# Patient Record
Sex: FEMALE | Race: WHITE
[De-identification: names, ages, dates, MRNs, and addresses within clinical notes are randomized per-mention and may not be internally consistent; named-entity substitution may affect disease eponyms.]

---

## 2017-01-24 ENCOUNTER — HOSPITAL ENCOUNTER (EMERGENCY)
Dept: HOSPITAL 17 - PHEFT | Age: 42
Discharge: HOME | End: 2017-01-24
Payer: SELF-PAY

## 2017-01-24 VITALS
DIASTOLIC BLOOD PRESSURE: 103 MMHG | RESPIRATION RATE: 16 BRPM | TEMPERATURE: 98.6 F | HEART RATE: 105 BPM | SYSTOLIC BLOOD PRESSURE: 135 MMHG | OXYGEN SATURATION: 99 %

## 2017-01-24 VITALS — SYSTOLIC BLOOD PRESSURE: 139 MMHG | DIASTOLIC BLOOD PRESSURE: 93 MMHG

## 2017-01-24 VITALS — HEIGHT: 64 IN | WEIGHT: 147.71 LBS | BODY MASS INDEX: 25.22 KG/M2

## 2017-01-24 DIAGNOSIS — S39.012A: Primary | ICD-10-CM
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
## 2017-01-24 PROCEDURE — 96372 THER/PROPH/DIAG INJ SC/IM: CPT

## 2017-01-24 PROCEDURE — 99283 EMERGENCY DEPT VISIT LOW MDM: CPT

## 2017-01-24 NOTE — PD
HPI


Chief Complaint:  Musculoskeletal Complaint


Time Seen by Provider:  17:40


Travel History


International Travel<30 days:  No


Contact w/Intl Traveler<30days:  No


Traveled to known affect area:  No





History of Present Illness


HPI


41-year-old female presents to the emergency room for evaluation of right-sided 

low back pain for the past 2 days.  Patient states she twisted funny and 

exacerbated her pain.  She has chronic low back pain due to arthritis and 

scoliosis.  States pain is constant, dull ache that occasionally becomes a sharp

, stabbing pain.  It occasionally radiates to the left lower back.  No 

radiation down the lower extremity.  States the pain is so severe at times that 

causes her to have a tension headache.  Headache is typical, gradual onset, 

wrapping around her head.  She has been taking ibuprofen for pain without 

significant relief in symptoms.  No saddle anesthesia, loss of bowel or bladder 

control, or lower extremity paresthesias  Denies IV drug use, fevers, and 

weight loss.





PFSH


Past Medical History


Depression:  Yes


Diminished Hearing:  No


Immunizations Current:  No


Pancreatitis:  Yes (HOSPITALIZED LAST MONTH IN ICU AT [facility])


Tetanus Vaccination:  > 5 Years


Influenza Vaccination:  No


Pregnant?:  Not Pregnant


LMP:  TUBAL


Tubal Ligation:  Yes





Past Surgical History


 Section:  Yes





Social History


Alcohol Use:  Yes ("ALCOHOLIC")


Tobacco Use:  Yes (1 PPD)


Substance Use:  Yes (ETOH )





Allergies-Medications


(Allergen,Severity, Reaction):  


Coded Allergies:  


     No Known Allergies (Unverified , 17)


Reported Meds & Prescriptions





Reported Meds & Active Scripts


Active


Ibuprofen 800 Mg Tab 800 Mg PO Q8H PRN


Robaxin (Methocarbamol) 750 Mg Tab 750 Mg PO Q8HR








Review of Systems


Except as stated in HPI:  all other systems reviewed are Neg





Physical Exam


Narrative


GENERAL: Well-nourished, well-developed female in no acute distress.  Afebrile.

  Ambulatory without antalgic gait.


SKIN: Warm and dry.


HEAD: Normocephalic.


EYES: No scleral icterus. No injection or drainage. 


NECK: Supple, trachea midline. No JVD or lymphadenopathy.


CARDIOVASCULAR: Regular rate and rhythm without murmurs, gallops, or rubs. 


RESPIRATORY: Breath sounds equal bilaterally. No accessory muscle use.


BACK: No midline tenderness.  No obvious deformity. No CVA tenderness.  Mild 

tenderness to palpation of the right lower paraspinous musculature.  2+ 

patellar reflexes equal bilaterally.





Data


Data


Last Documented VS





Vital Signs








  Date Time  Temp Pulse Resp B/P Pulse Ox O2 Delivery O2 Flow Rate FiO2


 


17 17:25 98.6 105 16 135/103 99   








Orders





 Orphenadrine Inj (Norflex Inj) (17 17:45)


Ketorolac Inj (Toradol Inj) (17 17:45)








MDM


Medical Decision Making


Medical Screen Exam Complete:  Yes


Emergency Medical Condition:  Yes


Medical Record Reviewed:  Yes


Differential Diagnosis


Muscle spasm versus strain versus fracture unlikely


Narrative Course


41-year-old female with history of chronic back pain due to scoliosis and 

arthritis presents to the emergency room for evaluation of low back pain for 

the past 2 days.  Patient states she twisted the wrong direction and has had 

pain since then.  Denies any other trauma.  Pain is causing a tension headache.

  Ibuprofen has not been improving symptoms.  She is well-appearing.  

Ambulatory without antalgic gait.  No focal neurological deficits.  No midline 

tenderness.  No red flag symptoms.  No indication for emergent imaging at this 

time.  Eforcse shows 2 prescriptions for Percocet from 2 different providers 

over the past 22 days.  She will be given Toradol and Norflex in the emergency 

room and discharged with prescriptions for ibuprofen and Robaxin.  Told to 

follow up with the primary care physician or return for worsening symptoms.  

She understands and agrees to plan.





Diagnosis





 Primary Impression:  


 Low back strain


 Qualified Code:  S39.012A - Low back strain, initial encounter


Referrals:  


Primary Care Physician


Patient Instructions:  General Instructions, Low Back Strain (ED)





***Additional Instructions:


Rest and drink plenty of fluids.


Take Robaxin as directed, as needed for pain.


Take ibuprofen with food as directed, as needed for pain.


Apply ice to the affected area for 20 minutes at a time, as needed for pain and 

swelling.


Follow-up with a primary care physician.


Return to the emergency room for worsening symptoms.


***Med/Other Pt SpecificInfo:  Prescription(s) given


Scripts


Ibuprofen 800 Mg Get508 Mg PO Q8H PRN (Pain/Inflammation) #21 TAB  Ref 0


   Prov:Des Doshi MD         17 


Methocarbamol (Robaxin)750 Mg Xns622 Mg PO Q8HR  #21 TAB  Ref 0


   Prov:Des Doshi MD         17


Disposition:  01 DISCHARGE HOME


Condition:  Stable








Alexandra Orellana 2017 17:48

## 2018-03-22 ENCOUNTER — HOSPITAL ENCOUNTER (EMERGENCY)
Dept: HOSPITAL 17 - NEPJ | Age: 43
LOS: 1 days | Discharge: HOME | End: 2018-03-23
Payer: SELF-PAY

## 2018-03-22 VITALS
HEART RATE: 112 BPM | TEMPERATURE: 98.5 F | SYSTOLIC BLOOD PRESSURE: 129 MMHG | DIASTOLIC BLOOD PRESSURE: 64 MMHG | OXYGEN SATURATION: 98 % | RESPIRATION RATE: 16 BRPM

## 2018-03-22 VITALS
OXYGEN SATURATION: 98 % | HEART RATE: 88 BPM | RESPIRATION RATE: 16 BRPM | DIASTOLIC BLOOD PRESSURE: 57 MMHG | SYSTOLIC BLOOD PRESSURE: 104 MMHG | TEMPERATURE: 98.3 F

## 2018-03-22 VITALS
DIASTOLIC BLOOD PRESSURE: 75 MMHG | RESPIRATION RATE: 19 BRPM | HEART RATE: 100 BPM | OXYGEN SATURATION: 99 % | SYSTOLIC BLOOD PRESSURE: 130 MMHG

## 2018-03-22 VITALS — HEIGHT: 64 IN | WEIGHT: 147.71 LBS | BODY MASS INDEX: 25.22 KG/M2

## 2018-03-22 DIAGNOSIS — L03.113: Primary | ICD-10-CM

## 2018-03-22 DIAGNOSIS — Z79.899: ICD-10-CM

## 2018-03-22 DIAGNOSIS — F19.10: ICD-10-CM

## 2018-03-22 DIAGNOSIS — F32.9: ICD-10-CM
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
## 2018-03-22 DIAGNOSIS — F10.229: ICD-10-CM

## 2018-03-22 DIAGNOSIS — F11.10: ICD-10-CM

## 2018-03-22 LAB
ALBUMIN SERPL-MCNC: 3.5 GM/DL (ref 3.4–5)
ALP SERPL-CCNC: 66 U/L (ref 45–117)
ALT SERPL-CCNC: 70 U/L (ref 10–53)
AST SERPL-CCNC: 38 U/L (ref 15–37)
BASOPHILS # BLD AUTO: 0 TH/MM3 (ref 0–0.2)
BASOPHILS NFR BLD: 0.8 % (ref 0–2)
BILIRUB SERPL-MCNC: 0.2 MG/DL (ref 0.2–1)
BUN SERPL-MCNC: 18 MG/DL (ref 7–18)
CALCIUM SERPL-MCNC: 8.7 MG/DL (ref 8.5–10.1)
CHLORIDE SERPL-SCNC: 108 MEQ/L (ref 98–107)
COLOR UR: YELLOW
CREAT SERPL-MCNC: 0.55 MG/DL (ref 0.5–1)
EOSINOPHIL # BLD: 0.1 TH/MM3 (ref 0–0.4)
EOSINOPHIL NFR BLD: 1.7 % (ref 0–4)
ERYTHROCYTE [DISTWIDTH] IN BLOOD BY AUTOMATED COUNT: 13.5 % (ref 11.6–17.2)
GFR SERPLBLD BASED ON 1.73 SQ M-ARVRAT: 120 ML/MIN (ref 89–?)
GLUCOSE SERPL-MCNC: 99 MG/DL (ref 74–106)
GLUCOSE UR STRIP-MCNC: (no result) MG/DL
HCO3 BLD-SCNC: 24.3 MEQ/L (ref 21–32)
HCT VFR BLD CALC: 38 % (ref 35–46)
HGB BLD-MCNC: 13.2 GM/DL (ref 11.6–15.3)
HGB UR QL STRIP: (no result)
HYALINE CASTS #/AREA URNS LPF: 1 /LPF
KETONES UR STRIP-MCNC: (no result) MG/DL
LYMPHOCYTES # BLD AUTO: 2.5 TH/MM3 (ref 1–4.8)
LYMPHOCYTES NFR BLD AUTO: 43 % (ref 9–44)
MCH RBC QN AUTO: 31.5 PG (ref 27–34)
MCHC RBC AUTO-ENTMCNC: 34.8 % (ref 32–36)
MCV RBC AUTO: 90.6 FL (ref 80–100)
MONOCYTE #: 0.5 TH/MM3 (ref 0–0.9)
MONOCYTES NFR BLD: 8.4 % (ref 0–8)
MUCOUS THREADS #/AREA URNS LPF: (no result) /LPF
NEUTROPHILS # BLD AUTO: 2.7 TH/MM3 (ref 1.8–7.7)
NEUTROPHILS NFR BLD AUTO: 46.1 % (ref 16–70)
NITRITE UR QL STRIP: (no result)
PLATELET # BLD: 185 TH/MM3 (ref 150–450)
PMV BLD AUTO: 8.4 FL (ref 7–11)
PROT SERPL-MCNC: 6.9 GM/DL (ref 6.4–8.2)
RBC # BLD AUTO: 4.2 MIL/MM3 (ref 4–5.3)
SODIUM SERPL-SCNC: 142 MEQ/L (ref 136–145)
SP GR UR STRIP: 1.01 (ref 1–1.03)
SQUAMOUS #/AREA URNS HPF: 6 /HPF (ref 0–5)
URINE LEUKOCYTE ESTERASE: (no result)
WBC # BLD AUTO: 5.8 TH/MM3 (ref 4–11)

## 2018-03-22 PROCEDURE — 99284 EMERGENCY DEPT VISIT MOD MDM: CPT

## 2018-03-22 PROCEDURE — 80053 COMPREHEN METABOLIC PANEL: CPT

## 2018-03-22 PROCEDURE — 84443 ASSAY THYROID STIM HORMONE: CPT

## 2018-03-22 PROCEDURE — 81001 URINALYSIS AUTO W/SCOPE: CPT

## 2018-03-22 PROCEDURE — 80307 DRUG TEST PRSMV CHEM ANLYZR: CPT

## 2018-03-22 PROCEDURE — 83690 ASSAY OF LIPASE: CPT

## 2018-03-22 PROCEDURE — 85025 COMPLETE CBC W/AUTO DIFF WBC: CPT

## 2018-03-22 RX ADMIN — SULFAMETHOXAZOLE AND TRIMETHOPRIM SCH TAB: 800; 160 TABLET ORAL at 20:59

## 2018-03-22 RX ADMIN — SULFAMETHOXAZOLE AND TRIMETHOPRIM SCH TAB: 800; 160 TABLET ORAL at 14:36

## 2018-03-22 NOTE — PD
HPI


Chief Complaint:  Psychiatric Symptoms


Time Seen by Provider:  13:31


Travel History


International Travel<30 days:  No


Contact w/Intl Traveler<30days:  No


Traveled to known affect area:  No





History of Present Illness


HPI


42-year-old  female brought in under the baker act with history of 

polysubstance abuse.  Patient is suicidal and threatening.  She is 

uncooperative.  Patient is noted to have a healing abscess to the right 

posterior shoulder from a "spider bite".  Patient denies fever, chills, or 

other symptoms.  She states she uses Percocet and alcohol.  She states she has 

been on Bactrim for 2 days for her abscess.  She states it was drained about 3 

weeks ago.  He continues to have some mild purulent drainage.  She denies 

significant pain.  She has no known drug allergies.





PFSH


Past Medical History


Depression:  Yes


Diminished Hearing:  No


Medical other:  Yes (Pt states she has a history of alcoholism and pancreatitis)


Immunizations Current:  No


Pancreatitis:  Yes (HOSPITALIZED LAST MONTH IN ICU AT [facility])


Pregnant?:  Not Pregnant


Tubal Ligation:  Yes





Past Surgical History


 Section:  Yes





Social History


Alcohol Use:  Yes (4 years sober, started drinking again today 3/22/18)


Tobacco Use:  Yes (1 pack/day)


Substance Use:  Yes (ETOH )





Allergies-Medications


(Allergen,Severity, Reaction):  


Coded Allergies:  


     No Known Allergies (Unverified  Allergy, Unknown, 3/22/18)


Reported Meds & Prescriptions





Reported Meds & Active Scripts


Active


Ibuprofen 800 Mg Tab 800 Mg PO Q8H PRN


Robaxin (Methocarbamol) 750 Mg Tab 750 Mg PO Q8HR








Review of Systems


ROS Limitations:  Uncooperative, Combative


Except as stated in HPI:  all other systems reviewed are Neg


General / Constitutional:  No: Fever, Chills


Eyes:  No: Visual changes


HENT:  No: Headaches


Cardiovascular:  No: Chest Pain or Discomfort


Respiratory:  No: Shortness of Breath


Gastrointestinal:  No: Abdominal Pain


Genitourinary:  No: Dysuria


Musculoskeletal:  No: Pain


Skin:  Positive Lesions (See history of present illness per), No Rash


Neurologic:  No: Weakness


Psychiatric:  No: Depression


Endocrine:  No: Polydipsia


Hematologic/Lymphatic:  No: Easy Bruising





Physical Exam


Exam Limitations:  Uncooperative, Combative


Narrative


GENERAL: Patient appears in no obvious distress per


SKIN: Warm and dry.  Patient appears to have an old abscess to the right 

posterior shoulder which appears to be healing slowly with normal granulation 

tissue noted without significant warmth or erythema noted.  There is small 

amount of purulent drainage noted.


HEAD: Atraumatic. Normocephalic. 


EYES: Pupils equal and round. No scleral icterus. No injection or drainage. 


ENT: No nasal bleeding or discharge.  Mucous membranes pink and moist.  Pharynx 

is clear.  Airways patent


NECK: Trachea midline.  Supple.


CARDIOVASCULAR: Regular rate and rhythm.  


RESPIRATORY: No accessory muscle use. Clear to auscultation. Breath sounds 

equal bilaterally. 


MUSCULOSKELETAL: Extremities without clubbing, cyanosis, or edema. No obvious 

deformities. 


NEUROLOGICAL: Awake and alert. No obvious cranial nerve deficits.  Motor 

grossly within normal limits. Five out of 5 muscle strength in the arms and 

legs.  Normal speech.


PSYCHIATRIC: Appropriate mood and affect; insight and judgment normal.





Data


Data


Last Documented VS





Vital Signs








  Date Time  Temp Pulse Resp B/P (MAP) Pulse Ox O2 Delivery O2 Flow Rate FiO2


 


3/22/18 13:15 98.3 88 16 104/57 (73) 98 Room Air  








Orders





 Orders


Complete Blood Count With Diff (3/22/18 13:30)


Comprehensive Metabolic Panel (3/22/18 13:30)


Thyroid Stimulating Hormone (3/22/18 13:30)


Urinalysis - C+S If Indicated (3/22/18 13:30)


Psych Screen (3/22/18 13:30)


Drug Screen, Random Urine (3/22/18 13:30)


Alcohol (Ethanol) (3/22/18 13:30)


Lipase (3/22/18 13:30)


Lorazepam (Ativan) (3/22/18 14:15)


Diphenhydramine (Benadryl) (3/22/18 14:15)


Sulfamet-Trimeth Ds 800-160 Mg (Bactrim (3/22/18 14:30)


Diet Regular Basic (3/22/18 Dinner)





Labs





Laboratory Tests








Test


  3/22/18


14:40 3/22/18


17:10


 


White Blood Count 5.8 TH/MM3  


 


Red Blood Count 4.20 MIL/MM3  


 


Hemoglobin 13.2 GM/DL  


 


Hematocrit 38.0 %  


 


Mean Corpuscular Volume 90.6 FL  


 


Mean Corpuscular Hemoglobin 31.5 PG  


 


Mean Corpuscular Hemoglobin


Concent 34.8 % 


  


 


 


Red Cell Distribution Width 13.5 %  


 


Platelet Count 185 TH/MM3  


 


Mean Platelet Volume 8.4 FL  


 


Neutrophils (%) (Auto) 46.1 %  


 


Lymphocytes (%) (Auto) 43.0 %  


 


Monocytes (%) (Auto) 8.4 %  


 


Eosinophils (%) (Auto) 1.7 %  


 


Basophils (%) (Auto) 0.8 %  


 


Neutrophils # (Auto) 2.7 TH/MM3  


 


Lymphocytes # (Auto) 2.5 TH/MM3  


 


Monocytes # (Auto) 0.5 TH/MM3  


 


Eosinophils # (Auto) 0.1 TH/MM3  


 


Basophils # (Auto) 0.0 TH/MM3  


 


CBC Comment DIFF FINAL  


 


Differential Comment   


 


Blood Urea Nitrogen 18 MG/DL  


 


Creatinine 0.55 MG/DL  


 


Random Glucose 99 MG/DL  


 


Total Protein 6.9 GM/DL  


 


Albumin 3.5 GM/DL  


 


Calcium Level 8.7 MG/DL  


 


Alkaline Phosphatase 66 U/L  


 


Aspartate Amino Transf


(AST/SGOT) 38 U/L 


  


 


 


Alanine Aminotransferase


(ALT/SGPT) 70 U/L 


  


 


 


Total Bilirubin 0.2 MG/DL  


 


Sodium Level 142 MEQ/L  


 


Potassium Level 3.1 MEQ/L  


 


Chloride Level 108 MEQ/L  


 


Carbon Dioxide Level 24.3 MEQ/L  


 


Anion Gap 10 MEQ/L  


 


Estimat Glomerular Filtration


Rate 120 ML/MIN 


  


 


 


Lipase 53 U/L  


 


Thyroid Stimulating Hormone


3rd Gen 0.511 uIU/ML 


  


 


 


Ethyl Alcohol Level 182 MG/DL  











[facility]


Medical Decision Making


Medical Screen Exam Complete:  Yes


Emergency Medical Condition:  Yes


Differential Diagnosis


Baker act.  Mood disorder.  Polysubstance abuse.  Suicidal ideation.


Narrative Course


Patient is medically stable at time of exam.


Psychiatric labs ordered per protocol including lipase as the patient has a 

history of pancreatitis.


Patient is given 1 mg lorazepam p.o. as well as 50 mg diphenhydramine p.o.


Patient is continued on Bactrim DS twice daily


Patient is medically cleared for psychiatric evaluation.





Diagnosis





 Primary Impression:  


 Cellulitis of right upper arm


Condition:  Stable











Sharif Haynes Mar 22, 2018 14:16

## 2018-03-23 VITALS
RESPIRATION RATE: 18 BRPM | HEART RATE: 88 BPM | DIASTOLIC BLOOD PRESSURE: 62 MMHG | OXYGEN SATURATION: 97 % | SYSTOLIC BLOOD PRESSURE: 113 MMHG

## 2018-03-23 VITALS
SYSTOLIC BLOOD PRESSURE: 114 MMHG | RESPIRATION RATE: 17 BRPM | HEART RATE: 91 BPM | OXYGEN SATURATION: 99 % | DIASTOLIC BLOOD PRESSURE: 69 MMHG

## 2018-03-23 RX ADMIN — SULFAMETHOXAZOLE AND TRIMETHOPRIM SCH TAB: 800; 160 TABLET ORAL at 08:35

## 2018-03-23 NOTE — PD
Physical Exam


Date Seen by Provider:  Mar 23, 2018


Time Seen by Provider:  12:30


Narrative


42-year-old female previously cleared for psychiatric evaluation has been seen 

by psychiatric staff and deemed stable for discharge at this time.  Patient 

remains medically stable at this time.  Follow-up will be based on psychiatric 

note.





Data


Data


Last Documented VS





Vital Signs








  Date Time  Temp Pulse Resp B/P (MAP) Pulse Ox O2 Delivery O2 Flow Rate FiO2


 


3/23/18 06:44  91 17 114/69 (84) 99 Room Air  


 


3/22/18 18:49 98.5       








Orders





 Orders


Complete Blood Count With Diff (3/22/18 13:30)


Comprehensive Metabolic Panel (3/22/18 13:30)


Thyroid Stimulating Hormone (3/22/18 13:30)


Urinalysis - C+S If Indicated (3/22/18 13:30)


Psych Screen (3/22/18 13:30)


Drug Screen, Random Urine (3/22/18 13:30)


Alcohol (Ethanol) (3/22/18 13:30)


Lipase (3/22/18 13:30)


Lorazepam (Ativan) (3/22/18 14:15)


Diphenhydramine (Benadryl) (3/22/18 14:15)


Sulfamet-Trimeth Ds 800-160 Mg (Bactrim (3/22/18 14:30)


Diet Regular Basic (3/22/18 Dinner)


Acetaminophen (Tylenol) (3/22/18 19:30)


Diphenhydramine (Benadryl) (3/22/18 21:30)


Diet Regular Basic (3/23/18 Breakfast)


Ibuprofen (Motrin) (3/23/18 08:30)


Diet Regular Basic (3/23/18 Lunch)





Labs





Laboratory Tests








Test


  3/22/18


14:40 3/22/18


17:10


 


White Blood Count 5.8 TH/MM3  


 


Red Blood Count 4.20 MIL/MM3  


 


Hemoglobin 13.2 GM/DL  


 


Hematocrit 38.0 %  


 


Mean Corpuscular Volume 90.6 FL  


 


Mean Corpuscular Hemoglobin 31.5 PG  


 


Mean Corpuscular Hemoglobin


Concent 34.8 % 


  


 


 


Red Cell Distribution Width 13.5 %  


 


Platelet Count 185 TH/MM3  


 


Mean Platelet Volume 8.4 FL  


 


Neutrophils (%) (Auto) 46.1 %  


 


Lymphocytes (%) (Auto) 43.0 %  


 


Monocytes (%) (Auto) 8.4 %  


 


Eosinophils (%) (Auto) 1.7 %  


 


Basophils (%) (Auto) 0.8 %  


 


Neutrophils # (Auto) 2.7 TH/MM3  


 


Lymphocytes # (Auto) 2.5 TH/MM3  


 


Monocytes # (Auto) 0.5 TH/MM3  


 


Eosinophils # (Auto) 0.1 TH/MM3  


 


Basophils # (Auto) 0.0 TH/MM3  


 


CBC Comment DIFF FINAL  


 


Differential Comment   


 


Blood Urea Nitrogen 18 MG/DL  


 


Creatinine 0.55 MG/DL  


 


Random Glucose 99 MG/DL  


 


Total Protein 6.9 GM/DL  


 


Albumin 3.5 GM/DL  


 


Calcium Level 8.7 MG/DL  


 


Alkaline Phosphatase 66 U/L  


 


Aspartate Amino Transf


(AST/SGOT) 38 U/L 


  


 


 


Alanine Aminotransferase


(ALT/SGPT) 70 U/L 


  


 


 


Total Bilirubin 0.2 MG/DL  


 


Sodium Level 142 MEQ/L  


 


Potassium Level 3.1 MEQ/L  


 


Chloride Level 108 MEQ/L  


 


Carbon Dioxide Level 24.3 MEQ/L  


 


Anion Gap 10 MEQ/L  


 


Estimat Glomerular Filtration


Rate 120 ML/MIN 


  


 


 


Lipase 53 U/L  


 


Thyroid Stimulating Hormone


3rd Gen 0.511 uIU/ML 


  


 


 


Ethyl Alcohol Level 182 MG/DL  


 


Urine Color  YELLOW 


 


Urine Turbidity  HAZY 


 


Urine pH  6.0 


 


Urine Specific Gravity  1.011 


 


Urine Protein  NEG mg/dL 


 


Urine Glucose (UA)  NEG mg/dL 


 


Urine Ketones  NEG mg/dL 


 


Urine Occult Blood  NEG 


 


Urine Nitrite  NEG 


 


Urine Bilirubin  NEG 


 


Urine Urobilinogen


  


  LESS THAN 2.0


MG/DL


 


Urine Leukocyte Esterase  NEG 


 


Urine WBC  3 /hpf 


 


Urine Squamous Epithelial


Cells 


  6 /hpf 


 


 


Urine Hyaline Casts  1 /lpf 


 


Urine Mucus  FEW /lpf 


 


Microscopic Urinalysis Comment


  


  CULT NOT


INDICATED


 


Urine Opiates Screen  NEG 


 


Urine Barbiturates Screen  POS 


 


Urine Amphetamines Screen  NEG 


 


Urine Benzodiazepines Screen  POS 


 


Urine Cocaine Screen  POS 


 


Urine Cannabinoids Screen  NEG 











[facility]


Medical Record Reviewed:  Yes


Supervised Visit with DANA:  Yes


Narrative Course


42-year-old female previously cleared for psychiatric evaluation has been seen 

by psychiatric staff and deemed stable for discharge at this time.  Patient 

remains medically stable at this time.  Patient is to finish the Bactrim that 

she has previously been prescribed.  Follow-up will be based on psychiatric 

note.


Diagnosis





 Primary Impression:  


 Cellulitis of right upper arm


Referrals:  


[facility]


Patient Instructions:  General Instructions


Disposition:  01 DISCHARGE HOME


Condition:  Stable











Sharif Haynes Mar 23, 2018 12:31

## 2018-03-23 NOTE — PD
__________________________________________________





History of Present Illness


Chief Complaint:  Psychiatric Symptoms


Time Seen by Provider:  12:00


Travel History


International Travel<30 Days:  No


Contact w/Intl Traveler<30days:  No


Known affected area:  No





Legal Status


Legal Status:  Baker Act


Baker Act Signed By:  Cipriano VILLASEÑOR


Baker Act Comment:  Officer Treat - Badge ID #[id number]


History of Present Illness:


History of Present Illness


HPI


42-year-old , single female, with history of alcohol abuse, opiate 

abuse currently living with her betsy who is brought in under a baker act 

initiated by law enforcement.  The Briones act alleges that the patient" stated 

that she did not get the job she wanted and has nothing left to live for.  

Subject stated she wants to end it all" she did not make any attempt at harming 

herself.  Upon arrival to the ED she was uncooperative, agitated and 

threatening.  Upon arrival at [facility] she proceeded to urinate on the floor as 

well as to throw her food against the wall.  She was intoxicated upon arrival 

with a blood alcohol level 182.  Her toxicology is positive for barbiturates, 

benzos, cocaine.  She was monitored in secure environment and was allowed to 

sober up clinically.


This morning the patient is clinically sober.  Her speech is clear, logical, 

goal-directed, of normal rate and tone.  Her affect is variable and 

appropriate.  She is maintaining basic hygiene.  Mood is nearly euthymic.  

There is no evidence of any hallucinations no delusions and no paranoia.  No 

korey or hypomania.  She admits that she made statements to her mother alluding 

to her not wanting to live anymore because she was angry at her mother for not 

doing something that she had asked her to do.  She also states" I was drunk 

when I said that and I was drunk when I acted the way I did yesterday."  I don'

t want to hurt myself and I don't want to hurt anyone else.  She acknowledges 

that she has a problem with alcohol and substances and shows interest in 

possibly getting on Vivitrol to help her maintain sobriety.





Patient gives verbal, permission to contact her betsy Marie at [phone].No 

answer.  He has concerns about her continued use of alcohol and her mood 

lability when she drinks..





PFSH


Past Medical History


Depression:  Yes


Diminished Hearing:  No


Medical other:  Yes (Pt states she has a history of alcoholism and pancreatitis)


Immunizations Current:  No


Pancreatitis:  Yes (HOSPITALIZED LAST MONTH IN ICU AT [facility])


Pregnant?:  Not Pregnant


Tubal Ligation:  Yes





Past Surgical History


 Section:  Yes





Psychiatric History


Psychiatric History


Hx Psychiatric Treatment:  


Pt reports remote  history of depression.  Currently not in treatment


History of Inpatient Treatment:  No


Guns or firearms in home:  No





Social History


Divorce female, born and raised in [place].  Mother of 3 adult children 

and 1 granddaughter.  Currently living with her betsy.  Unemployed and has 

worked in [employer].


Hx Alcohol Use:  Yes (4 years sober, started drinking again today 3/22/18)


Hx Tobacco Use:  Yes (1 pack/day)


Hx Substance Use:  Yes


Substance Use Type:  Alcohol, Other


Other Substances Used:  Pt admits to injecting "Roxys a couple of days ago"


Hx of Substance Use Treatment:  Yes





Family Psychiatric History


Negative





Allergies-Medications


(Allergen,Severity, Reaction):  


Coded Allergies:  


     No Known Allergies (Unverified  Allergy, Unknown, 3/22/18)


Reported Meds & Prescriptions





Reported Meds & Active Scripts


Active


Ibuprofen 800 Mg Tab 800 Mg PO Q8H PRN


Robaxin (Methocarbamol) 750 Mg Tab 750 Mg PO Q8HR





Review of Systems


Psychiatric:  DENIES: Anxiety, Confusion, Mood changes, Depression, 

Hallucinations, Agitation, Suicidal Ideation, Homicidal Ideation, Delusions


Except as stated in HPI:  all other systems reviewed are Neg





Mental Status Examination


Appearance:  Appropriate (in hospital attire.  Maintaining basic hygiene)


Consciousness:  Alert


Orientation:  x4


Motor Activity:  Normal gait


Speech:  Unremarkable


Language:  Adequate


Fund of Knowledge:  Adequate


Attention and Concentration:  Adequate


Memory:  Unremarkable


Mood:  Appropriate


Affect:  Appropriate


Thought Process & Associations:  Intact, Logical, Goal directed


Thought Content:  Appropriate


Hallucination Type:  None


Delusion Type:  None


Suicidal Ideation:  No


Suicidal Plan:  No


Suicidal Intention:  No


Homicidal Ideation:  No


Homicidal Plan:  No


Homicidal Intention:  No


Insight:  Fair


Judgment:  Adequate





MDM


Medical Decision Making


Medical Record Reviewed:  Yes


Assessment/Plan


42-year-old , single female, with history of alcohol abuse, opiate 

abuse currently living with her betsy who is brought in under a baker act 

initiated by law enforcement.  The Briones act alleges that the patient" stated 

that she did not get the job she wanted and has nothing left to live for.  

Subject stated she wants to end it all" she did not make any attempt at harming 

herself.  Upon arrival to the ED she was uncooperative, agitated and 

threatening.  Upon arrival at [facility] she proceeded to urinate on the floor as 

well as to throw her food against the wall.  She was intoxicated upon arrival 

with a blood alcohol level 182.  Her toxicology is positive for barbiturates, 

benzos, cocaine.  She was monitored in secure environment and was allowed to 

sober up clinically.  Patient wants clinically sober demonstrated no further 

behavioral concerns.  She is not psychotic and not manic.  Denies any suicidal 

or homicidal ideation, intent or plan.  She is future oriented.  Patient wants 

treatment for her alcohol dependence and showed interest in seeking treatment 

with Vivitrol.  She will be provided with that information.  She is also 

advised to follow-up with Damien Sanders.  The Baker act is lifted as patient 

is not presenting any evidence of unstable mental illness as defined under the 

Briones act.  Her mood changes can be attributed to her continued use of alcohol.


Briones act is lifted.  Psychiatrically clear for discharge from the ED.





Orders





 Orders


Complete Blood Count With Diff (3/22/18 13:30)


Comprehensive Metabolic Panel (3/22/18 13:30)


Thyroid Stimulating Hormone (3/22/18 13:30)


Urinalysis - C+S If Indicated (3/22/18 13:30)


Psych Screen (3/22/18 13:30)


Drug Screen, Random Urine (3/22/18 13:30)


Alcohol (Ethanol) (3/22/18 13:30)


Lipase (3/22/18 13:30)


Lorazepam (Ativan) (3/22/18 14:15)


Diphenhydramine (Benadryl) (3/22/18 14:15)


Sulfamet-Trimeth Ds 800-160 Mg (Bactrim (3/22/18 14:30)


Diet Regular Basic (3/22/18 Dinner)


Acetaminophen (Tylenol) (3/22/18 19:30)


Diphenhydramine (Benadryl) (3/22/18 21:30)


Diet Regular Basic (3/23/18 Breakfast)


Ibuprofen (Motrin) (3/23/18 08:30)


Diet Regular Basic (3/23/18 Lunch)





Results





Vital Signs








  Date Time  Temp Pulse Resp B/P (MAP) Pulse Ox O2 Delivery O2 Flow Rate FiO2


 


3/23/18 06:44  91 17 114/69 (84) 99 Room Air  


 


3/23/18 02:17  88 18 113/62 (79) 97 Room Air  


 


3/22/18 19:55  100 19 130/75 (93) 99 Room Air  


 


3/22/18 18:49 98.5 112 16 129/64 (85) 98 Room Air  


 


3/22/18 13:15 98.3 88 16 104/57 (73) 98 Room Air  











Laboratory Tests








Test


  3/22/18


14:40 3/22/18


17:10


 


White Blood Count 5.8  


 


Red Blood Count 4.20  


 


Hemoglobin 13.2  


 


Hematocrit 38.0  


 


Mean Corpuscular Volume 90.6  


 


Mean Corpuscular Hemoglobin 31.5  


 


Mean Corpuscular Hemoglobin


Concent 34.8 


  


 


 


Red Cell Distribution Width 13.5  


 


Platelet Count 185  


 


Mean Platelet Volume 8.4  


 


Neutrophils (%) (Auto) 46.1  


 


Lymphocytes (%) (Auto) 43.0  


 


Monocytes (%) (Auto) 8.4  


 


Eosinophils (%) (Auto) 1.7  


 


Basophils (%) (Auto) 0.8  


 


Neutrophils # (Auto) 2.7  


 


Lymphocytes # (Auto) 2.5  


 


Monocytes # (Auto) 0.5  


 


Eosinophils # (Auto) 0.1  


 


Basophils # (Auto) 0.0  


 


CBC Comment DIFF FINAL  


 


Differential Comment   


 


Blood Urea Nitrogen 18  


 


Creatinine 0.55  


 


Random Glucose 99  


 


Total Protein 6.9  


 


Albumin 3.5  


 


Calcium Level 8.7  


 


Alkaline Phosphatase 66  


 


Aspartate Amino Transf


(AST/SGOT) 38 


  


 


 


Alanine Aminotransferase


(ALT/SGPT) 70 


  


 


 


Total Bilirubin 0.2  


 


Sodium Level 142  


 


Potassium Level 3.1  


 


Chloride Level 108  


 


Carbon Dioxide Level 24.3  


 


Anion Gap 10  


 


Estimat Glomerular Filtration


Rate 120 


  


 


 


Lipase 53  


 


Thyroid Stimulating Hormone


3rd Gen 0.511 


  


 


 


Ethyl Alcohol Level 182  


 


Urine Color  YELLOW 


 


Urine Turbidity  HAZY 


 


Urine pH  6.0 


 


Urine Specific Gravity  1.011 


 


Urine Protein  NEG 


 


Urine Glucose (UA)  NEG 


 


Urine Ketones  NEG 


 


Urine Occult Blood  NEG 


 


Urine Nitrite  NEG 


 


Urine Bilirubin  NEG 


 


Urine Urobilinogen  LESS THAN 2.0 


 


Urine Leukocyte Esterase  NEG 


 


Urine WBC  3 


 


Urine Squamous Epithelial


Cells 


  6 


 


 


Urine Hyaline Casts  1 


 


Urine Mucus  FEW 


 


Microscopic Urinalysis Comment


  


  CULT NOT


INDICATED


 


Urine Opiates Screen  NEG 


 


Urine Barbiturates Screen  POS 


 


Urine Amphetamines Screen  NEG 


 


Urine Benzodiazepines Screen  POS 


 


Urine Cocaine Screen  POS 


 


Urine Cannabinoids Screen  NEG 











Diagnosis





 Primary Impression:  


 Cellulitis of right upper arm


 Additional Impressions:  


 Alcohol dependence with intoxication


 Alcohol-induced mood disorder


Psychiatrically Cleared:  Yes


***Med/ Other Pt Specific Info:  No Meds Exist/No RX given


Disposition:  01 DISCHARGE HOME


Condition:  Stable





Problem Qualifiers








 Additional Impressions:  


 Alcohol dependence with intoxication


 Qualified Codes:  F10.220 - Alcohol dependence with intoxication, uncomplicated








Cecelia Rust [facility] Mar 23, 2018 12:19